# Patient Record
Sex: MALE | URBAN - METROPOLITAN AREA
[De-identification: names, ages, dates, MRNs, and addresses within clinical notes are randomized per-mention and may not be internally consistent; named-entity substitution may affect disease eponyms.]

---

## 2022-12-20 ENCOUNTER — MEDICAL CORRESPONDENCE (OUTPATIENT)
Dept: HEALTH INFORMATION MANAGEMENT | Facility: CLINIC | Age: 47
End: 2022-12-20

## 2022-12-20 ENCOUNTER — TRANSFERRED RECORDS (OUTPATIENT)
Dept: HEALTH INFORMATION MANAGEMENT | Facility: CLINIC | Age: 47
End: 2022-12-20

## 2022-12-28 ENCOUNTER — TELEPHONE (OUTPATIENT)
Dept: UROLOGY | Facility: CLINIC | Age: 47
End: 2022-12-28

## 2022-12-28 ENCOUNTER — TRANSCRIBE ORDERS (OUTPATIENT)
Dept: OTHER | Age: 47
End: 2022-12-28

## 2022-12-28 DIAGNOSIS — N42.9 PROSTATE DISEASE: Primary | ICD-10-CM

## 2022-12-28 NOTE — TELEPHONE ENCOUNTER
[facility] Call Center    Phone Message    May a detailed message be left on voicemail: yes     Reason for Call: Appointment Intake    Referring Provider Name: Perry Simmons NP  [facility]  Diagnosis and/or Symptoms: Prostate disease, Hx of prostate issues, ?BPH    Patient being referred for Urgent Appointment (1-2 weeks) by referring provider. Sending encounter message for clinic review and follow-up with patient for scheduling. Thank you!    Action Taken: Message routed to:  [facility] ([facility]): Urology    Travel Screening: Not Applicable

## 2023-01-05 NOTE — TELEPHONE ENCOUNTER
Referral has been reviewed by triage who said to schedule next avail with the next NP/PA video or in person. Otherwise patient can contact his referring clinic for options.